# Patient Record
Sex: FEMALE | Race: OTHER | HISPANIC OR LATINO | URBAN - METROPOLITAN AREA
[De-identification: names, ages, dates, MRNs, and addresses within clinical notes are randomized per-mention and may not be internally consistent; named-entity substitution may affect disease eponyms.]

---

## 2017-09-03 ENCOUNTER — EMERGENCY (EMERGENCY)
Facility: HOSPITAL | Age: 37
LOS: 1 days | Discharge: HOME | End: 2017-09-03
Attending: EMERGENCY MEDICINE
Payer: COMMERCIAL

## 2017-09-03 VITALS
DIASTOLIC BLOOD PRESSURE: 80 MMHG | SYSTOLIC BLOOD PRESSURE: 120 MMHG | RESPIRATION RATE: 16 BRPM | TEMPERATURE: 99 F | OXYGEN SATURATION: 100 % | HEART RATE: 81 BPM

## 2017-09-03 LAB
BASOPHILS # BLD AUTO: 0.01 K/UL — SIGNIFICANT CHANGE UP (ref 0–0.2)
BASOPHILS NFR BLD AUTO: 0.1 % — SIGNIFICANT CHANGE UP (ref 0–2)
BUN SERPL-MCNC: 11 MG/DL — SIGNIFICANT CHANGE UP (ref 7–23)
CALCIUM SERPL-MCNC: 9.6 MG/DL — SIGNIFICANT CHANGE UP (ref 8.4–10.5)
CHLORIDE SERPL-SCNC: 101 MMOL/L — SIGNIFICANT CHANGE UP (ref 98–107)
CK SERPL-CCNC: 87 U/L — SIGNIFICANT CHANGE UP (ref 25–170)
CO2 SERPL-SCNC: 27 MMOL/L — SIGNIFICANT CHANGE UP (ref 22–31)
CREAT SERPL-MCNC: 0.72 MG/DL — SIGNIFICANT CHANGE UP (ref 0.5–1.3)
EOSINOPHIL # BLD AUTO: 0.13 K/UL — SIGNIFICANT CHANGE UP (ref 0–0.5)
EOSINOPHIL NFR BLD AUTO: 1.9 % — SIGNIFICANT CHANGE UP (ref 0–6)
GLUCOSE SERPL-MCNC: 92 MG/DL — SIGNIFICANT CHANGE UP (ref 70–99)
HCG SERPL-ACNC: < 5 MIU/ML — SIGNIFICANT CHANGE UP
HCT VFR BLD CALC: 40.1 % — SIGNIFICANT CHANGE UP (ref 34.5–45)
HGB BLD-MCNC: 13.3 G/DL — SIGNIFICANT CHANGE UP (ref 11.5–15.5)
IMM GRANULOCYTES # BLD AUTO: 0.01 # — SIGNIFICANT CHANGE UP
IMM GRANULOCYTES NFR BLD AUTO: 0.1 % — SIGNIFICANT CHANGE UP (ref 0–1.5)
LYMPHOCYTES # BLD AUTO: 2.41 K/UL — SIGNIFICANT CHANGE UP (ref 1–3.3)
LYMPHOCYTES # BLD AUTO: 35.2 % — SIGNIFICANT CHANGE UP (ref 13–44)
MCHC RBC-ENTMCNC: 27.7 PG — SIGNIFICANT CHANGE UP (ref 27–34)
MCHC RBC-ENTMCNC: 33.2 % — SIGNIFICANT CHANGE UP (ref 32–36)
MCV RBC AUTO: 83.4 FL — SIGNIFICANT CHANGE UP (ref 80–100)
MONOCYTES # BLD AUTO: 0.66 K/UL — SIGNIFICANT CHANGE UP (ref 0–0.9)
MONOCYTES NFR BLD AUTO: 9.6 % — SIGNIFICANT CHANGE UP (ref 2–14)
NEUTROPHILS # BLD AUTO: 3.62 K/UL — SIGNIFICANT CHANGE UP (ref 1.8–7.4)
NEUTROPHILS NFR BLD AUTO: 53.1 % — SIGNIFICANT CHANGE UP (ref 43–77)
NRBC # FLD: 0 — SIGNIFICANT CHANGE UP
PLATELET # BLD AUTO: 208 K/UL — SIGNIFICANT CHANGE UP (ref 150–400)
PMV BLD: 10.8 FL — SIGNIFICANT CHANGE UP (ref 7–13)
POTASSIUM SERPL-MCNC: 4.3 MMOL/L — SIGNIFICANT CHANGE UP (ref 3.5–5.3)
POTASSIUM SERPL-SCNC: 4.3 MMOL/L — SIGNIFICANT CHANGE UP (ref 3.5–5.3)
RBC # BLD: 4.81 M/UL — SIGNIFICANT CHANGE UP (ref 3.8–5.2)
RBC # FLD: 12.3 % — SIGNIFICANT CHANGE UP (ref 10.3–14.5)
SODIUM SERPL-SCNC: 140 MMOL/L — SIGNIFICANT CHANGE UP (ref 135–145)
TROPONIN T SERPL-MCNC: < 0.06 NG/ML — SIGNIFICANT CHANGE UP (ref 0–0.06)
WBC # BLD: 6.84 K/UL — SIGNIFICANT CHANGE UP (ref 3.8–10.5)
WBC # FLD AUTO: 6.84 K/UL — SIGNIFICANT CHANGE UP (ref 3.8–10.5)

## 2017-09-03 PROCEDURE — 99222 1ST HOSP IP/OBS MODERATE 55: CPT

## 2017-09-03 PROCEDURE — 99284 EMERGENCY DEPT VISIT MOD MDM: CPT | Mod: 25

## 2017-09-03 PROCEDURE — 70450 CT HEAD/BRAIN W/O DYE: CPT | Mod: 26

## 2017-09-03 PROCEDURE — 70548 MR ANGIOGRAPHY NECK W/DYE: CPT | Mod: 26

## 2017-09-03 PROCEDURE — 70551 MRI BRAIN STEM W/O DYE: CPT | Mod: 26

## 2017-09-03 PROCEDURE — 93010 ELECTROCARDIOGRAM REPORT: CPT | Mod: 59

## 2017-09-03 PROCEDURE — 70544 MR ANGIOGRAPHY HEAD W/O DYE: CPT | Mod: 26,59

## 2017-09-03 RX ORDER — ONDANSETRON 8 MG/1
4 TABLET, FILM COATED ORAL ONCE
Qty: 0 | Refills: 0 | Status: COMPLETED | OUTPATIENT
Start: 2017-09-03 | End: 2017-09-03

## 2017-09-03 RX ORDER — MECLIZINE HCL 12.5 MG
50 TABLET ORAL ONCE
Qty: 0 | Refills: 0 | Status: COMPLETED | OUTPATIENT
Start: 2017-09-03 | End: 2017-09-03

## 2017-09-03 RX ORDER — SODIUM CHLORIDE 9 MG/ML
1000 INJECTION INTRAMUSCULAR; INTRAVENOUS; SUBCUTANEOUS ONCE
Qty: 0 | Refills: 0 | Status: COMPLETED | OUTPATIENT
Start: 2017-09-03 | End: 2017-09-03

## 2017-09-03 RX ORDER — MECLIZINE HCL 12.5 MG
25 TABLET ORAL ONCE
Qty: 0 | Refills: 0 | Status: COMPLETED | OUTPATIENT
Start: 2017-09-03 | End: 2017-09-03

## 2017-09-03 RX ORDER — METOCLOPRAMIDE HCL 10 MG
10 TABLET ORAL ONCE
Qty: 0 | Refills: 0 | Status: DISCONTINUED | OUTPATIENT
Start: 2017-09-03 | End: 2017-09-03

## 2017-09-03 RX ADMIN — Medication 50 MILLIGRAM(S): at 15:57

## 2017-09-03 RX ADMIN — ONDANSETRON 4 MILLIGRAM(S): 8 TABLET, FILM COATED ORAL at 14:16

## 2017-09-03 RX ADMIN — SODIUM CHLORIDE 1000 MILLILITER(S): 9 INJECTION INTRAMUSCULAR; INTRAVENOUS; SUBCUTANEOUS at 14:17

## 2017-09-03 RX ADMIN — Medication 25 MILLIGRAM(S): at 14:16

## 2017-09-03 NOTE — ED CDU PROVIDER NOTE - ATTENDING CONTRIBUTION TO CARE
Attending Statement: I have reviewed and agree with all pertinent clinical information, including history and physical exam and agree with treatment plan of the PA, except as noted.  see HPI/PE CDU ATT ADMIT NOTE Dr. Viramontes: I performed a face to face bedside interview with patient regarding history of present illness, review of symptoms and past medical history. I completed an independent physical exam.  I have discussed patient's plan of care with PA.   I agree with note as stated above, having amended the EMR as needed to reflect my findings.   This includes HISTORY OF PRESENT ILLNESS, HIV, PAST MEDICAL/SURGICAL/FAMILY/SOCIAL HISTORY, ALLERGIES AND HOME MEDICATIONS, REVIEW OF SYSTEMS, PHYSICAL EXAM, and any PROGRESS NOTES during the time I functioned as the attending physician for this patient. 37F c/o 3d nausea and vertigo. Past three days patient notes room spinning sensation, nausea, neg emesis. Neg ha, cp, sob. Main ED meclizine, valium, zofran, seen by Neuro for no relief. Patient c/o persistent left sided ha and nausea. CDU for symptom control, MRI, MRA, neuro reassess. PE nad, aaox3, perrl, eomi, ctabl, rrr, s1s2, abd soft ntnd, neg le edema. Neg nystagmus. Nl finger nose. Nl rapid alt movement. Nl heel shin. Nl gait. Neg romberg.

## 2017-09-03 NOTE — ED CDU PROVIDER NOTE - PLAN OF CARE
take meclizine 25mg every 8 hours as needed for dizziness. Drink plenty of fluids. Follow up with Keiry Alexandra [phone] within 1-2 weeks. Return to ED for any worsening symptoms.

## 2017-09-03 NOTE — ED CDU PROVIDER NOTE - PROGRESS NOTE DETAILS
CONCHA Vega: pt resting comfortably in bed NAD pt states she feels better.  Pt returned from MRI, will continue to monitor. CONCHA Vega: pt sleeping comfortably in bed NAD.  Pt received MRI awaiting results, will continue to monitor. CONCHA Prieto: Received sign out from CONCAH Vega. Pt evaluated at bedside with Dr. Coko. Is asymptomatic at present. MRI read negative for acute pathology. Evaluated by neurology, cleared for discharge. CDU MD CHO:  Pt resting comfortably in nad, no complaints.  No nystagmus, motor 5/5 x4 ext, distal sensory grossly intact x4 ext, normal gait, normal heel to shin, normal finger to nose.  Pending MRI read. CDU MD CHO - Attending Discharge Note: Patient is stable.  Labs and tests reviewed with the patient.  The patient is stable for discharge.

## 2017-09-03 NOTE — ED CDU PROVIDER NOTE - PHYSICAL EXAMINATION
pt no distress. EOMI. no nystagmus. no facial asymmetry. AOx3, no focal deficits. CN 2-12 grossly intact. nl gait. no meningeal signs. no pronator drift. nl tm bl. nl sensation bl face/arms and lower ext.

## 2017-09-03 NOTE — ED PROVIDER NOTE - PROGRESS NOTE DETAILS
pt still symptomatic, feeling "room moving" Informed of results. Neurology consult appreciated, recommend CDU. pt willing to stay. has no pmd. Kevin Viramontes.

## 2017-09-03 NOTE — ED CDU PROVIDER NOTE - DETAILS
36 y/o F pt came in to ED with c/o dizziness, nausea and headache, was given meclizine, zofran, valium, with out much relief, neuro evaluated pt in ED sent her to CDU for pain / nausea control, MRA head neck, MRI head. Pt still c/o left sided pressure headache left sided facial pain with nausea.sent to cdu for MRI/MRA head and neck, monitor and reassess

## 2017-09-03 NOTE — ED PROVIDER NOTE - PHYSICAL EXAMINATION
pt no distress. EOMI. no nystagmus. no facial asymmetry. AOx3, no focal deficits. CN 2-12 grossly intact. nl gait. no meningeal signs. no pronator drift. nl tm bl. pt no distress. EOMI. no nystagmus. no facial asymmetry. AOx3, no focal deficits. CN 2-12 grossly intact. nl gait. no meningeal signs. no pronator drift. nl tm bl. nl sensation bl face/arms and lower ext.

## 2017-09-03 NOTE — CONSULT NOTE ADULT - SUBJECTIVE AND OBJECTIVE BOX
Neurology Consult Note    Reason for consult - Dizziness vertigo like symptoms for 3 days    Pt is a 36yo F no sig pmhx pw three days of feeling dizzy, room spinning. It has been constant associated w nausea. She reports having some numbness and discomfort in her left hand. Pt denies any other change sin lifestyle, eating, no recent ear infections. Pt reports the symptoms started suddenly and she feels it more on the left side of her face and head. She reports that when she walks she feels heavier on her left side.     ROS no vomiting no headache. no fever or chills. Discomfort of the left hand, "feels numb" no weakness. no cp or sob. no abdominal pain. no change in hearing or sight. no blurred vision. no double vision. not on AC. LMP couple days ago, Denies preg.  Home medications   No significant PMHx      Vital Signs Last 24 Hrs  T(C): 37.1 (03 Sep 2017 14:18), Max: 37.1 (03 Sep 2017 14:18)  T(F): 98.7 (03 Sep 2017 14:18), Max: 98.7 (03 Sep 2017 14:18)  HR: 74 (03 Sep 2017 14:18) (74 - 81)  BP: 122/70 (03 Sep 2017 14:18) (120/80 - 122/70)  BP(mean): --  RR: 20 (03 Sep 2017 14:18) (16 - 20)  SpO2: 100% (03 Sep 2017 14:18) (100% - 100%)    Physical Exam  General - calm pleasant female lying in ED stretcher  Face - no periorbital tenderness, no mastoid tenderness, no tragal tenderness   EAR - right ear blocked with wax tympanic membrane no visualized, left ear tympanic membrane clear  Neurological:  MS - AAOx3, rep ition and naming in tact, good fund of knowledge, clear and fluent speech  CNs - face symmetric, no nystagmus appreciated in any direction, there was a very mild beat on left gaze, hearing b/l symmetric  Motor - 5/5 throughout  Sensation - 95% decrease in sensation on the left hand if the right hand was 100%, temperature, propripception and light touch in tact throughout   Coordination - FTN in tact B/L   Gait - slightly hesitant upon standing, however pt was dizzy at the time     Fiona edmondson pike- negative b/l  Head thrust - no nystagmus     Labs                        13.3   6.84  )-----------( 208      ( 03 Sep 2017 14:29 )             40.1   09-03    140  |  101  |  11  ----------------------------<  92  4.3   |  27  |  0.72    Ca    9.6      03 Sep 2017 14:29    Imaging  Ct head (9/3) - No CT evidence of acute intracranial hemorrhage, brain edema, mass effect or acute intracranial pathology.

## 2017-09-03 NOTE — CONSULT NOTE ADULT - ASSESSMENT
Pt is a 36yo F no sig pmhx pw three days of vertiginous symptoms (room spinng, dizziness and nausea) for three days. Pt reports there was a slight improvement however was transient and she felt dizzy again and has been ongoing for three days. On physical exam pt does not have any peripheral signs (nystagmus, dixhallpike was negative, tympanic membranes clear b/bl Pt is a 36yo F no sig pmhx pw three days of vertiginous symptoms (room spinng, dizziness and nausea) for three days. Pt reports there was a slight improvement however was transient and she felt dizzy again and has been ongoing for three days. She has no hearing chnages or ringing in the ears, no vision changes, weakness anywhere but does report an abnormal sensation in her left hand and having issues picking up her kid. On physical exam pt does not have any peripheral signs (nystagmus, dixhallpike was negative, tympanic membranes clear B/l. No other deficits, no weakness or focal CN findings. CT head shows no acute findings. Given that pt does not have any clear peripheral cause, and symptoms are ongoing without intermittent resolution.     Dx - Vertigo 2/2 CVA vs peripheral causes Meniere's labrynthitis)     Recommendations:  MRI wo gadolinium to rule out acute CVA   Meclizine PRN for dizziness  IVF    Spectra [id number]

## 2017-09-03 NOTE — ED PROVIDER NOTE - OBJECTIVE STATEMENT
36yo F no sig pmhx pw three days of feeling dizzy, room is spinning. It has been constant associated w nausea. no vomit. no headache. no fever or chills. Discomfort of the left hand, "feels numb" no weakness. no cp or sob. no abdominal pain. no change in hearing or sight. no blurred vision. no double vision. not on AC. LMP couple days ago, denies preg.

## 2017-09-03 NOTE — ED ADULT TRIAGE NOTE - CHIEF COMPLAINT QUOTE
pt c/o dizziness x3 days. reports spinning sensation initially now c/o feeling unbalanced. was [facility] and was instructed to come to ED for head ct. [id number]

## 2017-09-03 NOTE — ED CDU PROVIDER NOTE - OBJECTIVE STATEMENT
ED Provider note: 38yo F no sig pmhx pw three days of feeling dizzy, room is spinning. It has been constant associated w nausea. no vomit. no headache. no fever or chills. Discomfort of the left hand, "feels numb" no weakness. no cp or sob. no abdominal pain. no change in hearing or sight. no blurred vision. no double vision. not on AC. LMP couple days ago, denies preg.    CDU CONCHA Patterson note: I agree with above ED Provider note: 36yo F no sig pmhx pw three days of feeling dizzy, room is spinning. It has been constant associated w nausea. no vomit. no headache. no fever or chills. Discomfort of the left hand, "feels numb" no weakness. no cp or sob. no abdominal pain. no change in hearing or sight. no blurred vision. no double vision. not on AC. LMP couple days ago, denies preg.    CDU PA Leonardo: I agree with above. 38 yo F no pmhx presents with 3 days of feeling dizzy, room is spinning. It has been constant associated w nausea. Pt was given meclizine, zofran, valium, with out much relief, neuro evaluated pt in ED sent her to CDU for pain / nausea control, MRA head neck, MRI head. Pt still c/o left sided pressure headache left sided facial pain with nausea. Pt denies vomiting, sob, cp, weakness, abd pain, dysuria.

## 2017-09-04 VITALS
HEART RATE: 89 BPM | OXYGEN SATURATION: 100 % | SYSTOLIC BLOOD PRESSURE: 107 MMHG | TEMPERATURE: 98 F | DIASTOLIC BLOOD PRESSURE: 69 MMHG | RESPIRATION RATE: 16 BRPM

## 2017-09-04 LAB
CHOLEST SERPL-MCNC: 159 MG/DL — SIGNIFICANT CHANGE UP (ref 120–199)
HDLC SERPL-MCNC: 45 MG/DL — SIGNIFICANT CHANGE UP (ref 45–65)
LIPID PNL WITH DIRECT LDL SERPL: 109 MG/DL — SIGNIFICANT CHANGE UP
TRIGL SERPL-MCNC: 96 MG/DL — SIGNIFICANT CHANGE UP (ref 10–149)

## 2017-09-04 RX ORDER — MECLIZINE HCL 12.5 MG
1 TABLET ORAL
Qty: 20 | Refills: 0 | OUTPATIENT
Start: 2017-09-04

## 2017-10-10 ENCOUNTER — APPOINTMENT (OUTPATIENT)
Dept: OBGYN | Facility: CLINIC | Age: 37
End: 2017-10-10
Payer: COMMERCIAL

## 2017-10-10 VITALS
SYSTOLIC BLOOD PRESSURE: 108 MMHG | WEIGHT: 151 LBS | HEART RATE: 86 BPM | BODY MASS INDEX: 26.75 KG/M2 | DIASTOLIC BLOOD PRESSURE: 74 MMHG | HEIGHT: 63 IN

## 2017-10-10 PROCEDURE — 99395 PREV VISIT EST AGE 18-39: CPT

## 2017-10-10 RX ORDER — MECLIZINE HYDROCHLORIDE 25 MG/1
25 TABLET ORAL
Qty: 20 | Refills: 0 | Status: DISCONTINUED | COMMUNITY
Start: 2017-09-05

## 2017-10-10 RX ORDER — MULTIVITAMIN
TABLET ORAL
Refills: 0 | Status: ACTIVE | COMMUNITY

## 2018-01-02 ENCOUNTER — APPOINTMENT (OUTPATIENT)
Dept: OBGYN | Facility: CLINIC | Age: 38
End: 2018-01-02
Payer: COMMERCIAL

## 2018-01-02 VITALS
BODY MASS INDEX: 26.93 KG/M2 | DIASTOLIC BLOOD PRESSURE: 75 MMHG | HEART RATE: 178 BPM | WEIGHT: 152 LBS | HEIGHT: 63 IN | SYSTOLIC BLOOD PRESSURE: 112 MMHG

## 2018-01-02 DIAGNOSIS — N60.12 DIFFUSE CYSTIC MASTOPATHY OF LEFT BREAST: ICD-10-CM

## 2018-01-02 PROCEDURE — 99213 OFFICE O/P EST LOW 20 MIN: CPT

## 2018-01-29 ENCOUNTER — APPOINTMENT (OUTPATIENT)
Dept: MAMMOGRAPHY | Facility: IMAGING CENTER | Age: 38
End: 2018-01-29

## 2018-01-29 ENCOUNTER — APPOINTMENT (OUTPATIENT)
Dept: ULTRASOUND IMAGING | Facility: IMAGING CENTER | Age: 38
End: 2018-01-29

## 2020-11-16 ENCOUNTER — APPOINTMENT (OUTPATIENT)
Dept: OBGYN | Facility: CLINIC | Age: 40
End: 2020-11-16
Payer: COMMERCIAL

## 2020-11-16 VITALS
TEMPERATURE: 98.6 F | DIASTOLIC BLOOD PRESSURE: 72 MMHG | HEART RATE: 70 BPM | SYSTOLIC BLOOD PRESSURE: 112 MMHG | HEIGHT: 63 IN | BODY MASS INDEX: 27.46 KG/M2 | WEIGHT: 155 LBS

## 2020-11-16 DIAGNOSIS — K92.1 MELENA: ICD-10-CM

## 2020-11-16 DIAGNOSIS — R10.32 LEFT LOWER QUADRANT PAIN: ICD-10-CM

## 2020-11-16 PROCEDURE — 99072 ADDL SUPL MATRL&STAF TM PHE: CPT

## 2020-11-16 PROCEDURE — 99396 PREV VISIT EST AGE 40-64: CPT

## 2020-11-16 NOTE — PHYSICAL EXAM
[Appropriately responsive] : appropriately responsive [Alert] : alert [No Acute Distress] : no acute distress [No Lymphadenopathy] : no lymphadenopathy [Regular Rate Rhythm] : regular rate rhythm [No Murmurs] : no murmurs [Clear to Auscultation B/L] : clear to auscultation bilaterally [Soft] : soft [Non-tender] : non-tender [Non-distended] : non-distended [No HSM] : No HSM [No Lesions] : no lesions [No Mass] : no mass [Oriented x3] : oriented x3 [Examination Of The Breasts] : a normal appearance [No Masses] : no breast masses were palpable [Labia Majora] : normal [Labia Minora] : normal [Normal] : normal [Anteversion] : anteverted [Uterine Adnexae] : normal [Nl Sphincter Tone] : normal sphincter tone

## 2020-11-16 NOTE — HISTORY OF PRESENT ILLNESS
[FreeTextEntry1] : 40 y.o. P 1001 [facility] 10/end/20 for annual exam. pt c/o left sided pain and blood in stool when she wiped . pt is due for pap and mammo,  .

## 2020-11-16 NOTE — DISCUSSION/SUMMARY
[FreeTextEntry1] : A - WWV\par      blood in stool\par      LLQ pain\par \par P-- f/u for results\par       pap , Gen probe Urine C&S, Affirmed done\par       referral for G.I. for blood in stool given\par       referral for mammo given \par      referral for pelvic sono for LLQ pain at same location as mammo. \par      nutritional counseling provided\par      exercise encouraged

## 2020-11-17 LAB
C TRACH RRNA SPEC QL NAA+PROBE: NOT DETECTED
CANDIDA VAG CYTO: NOT DETECTED
G VAGINALIS+PREV SP MTYP VAG QL MICRO: NOT DETECTED
N GONORRHOEA RRNA SPEC QL NAA+PROBE: NOT DETECTED
SOURCE AMPLIFICATION: NORMAL
T VAGINALIS VAG QL WET PREP: NOT DETECTED

## 2020-11-18 LAB
BACTERIA UR CULT: NORMAL
HPV HIGH+LOW RISK DNA PNL CVX: NOT DETECTED

## 2020-11-20 LAB — CYTOLOGY CVX/VAG DOC THIN PREP: ABNORMAL

## 2020-12-07 ENCOUNTER — NON-APPOINTMENT (OUTPATIENT)
Age: 40
End: 2020-12-07

## 2020-12-09 ENCOUNTER — NON-APPOINTMENT (OUTPATIENT)
Age: 40
End: 2020-12-09

## 2020-12-10 ENCOUNTER — NON-APPOINTMENT (OUTPATIENT)
Age: 40
End: 2020-12-10

## 2020-12-11 ENCOUNTER — NON-APPOINTMENT (OUTPATIENT)
Age: 40
End: 2020-12-11

## 2021-02-02 ENCOUNTER — EMERGENCY (EMERGENCY)
Facility: HOSPITAL | Age: 41
LOS: 1 days | Discharge: HOME | End: 2021-02-02
Attending: STUDENT IN AN ORGANIZED HEALTH CARE EDUCATION/TRAINING PROGRAM | Admitting: STUDENT IN AN ORGANIZED HEALTH CARE EDUCATION/TRAINING PROGRAM
Payer: COMMERCIAL

## 2021-02-02 VITALS
OXYGEN SATURATION: 100 % | HEART RATE: 94 BPM | RESPIRATION RATE: 16 BRPM | SYSTOLIC BLOOD PRESSURE: 111 MMHG | DIASTOLIC BLOOD PRESSURE: 82 MMHG | TEMPERATURE: 98 F

## 2021-02-02 VITALS
HEIGHT: 63 IN | HEART RATE: 83 BPM | DIASTOLIC BLOOD PRESSURE: 92 MMHG | RESPIRATION RATE: 16 BRPM | TEMPERATURE: 98 F | OXYGEN SATURATION: 100 % | SYSTOLIC BLOOD PRESSURE: 138 MMHG

## 2021-02-02 LAB
APPEARANCE UR: CLEAR — SIGNIFICANT CHANGE UP
BILIRUB UR-MCNC: NEGATIVE — SIGNIFICANT CHANGE UP
COLOR SPEC: COLORLESS — SIGNIFICANT CHANGE UP
DIFF PNL FLD: NEGATIVE — SIGNIFICANT CHANGE UP
GLUCOSE UR QL: NEGATIVE — SIGNIFICANT CHANGE UP
KETONES UR-MCNC: NEGATIVE — SIGNIFICANT CHANGE UP
LEUKOCYTE ESTERASE UR-ACNC: NEGATIVE — SIGNIFICANT CHANGE UP
NITRITE UR-MCNC: NEGATIVE — SIGNIFICANT CHANGE UP
PH UR: 6.5 — SIGNIFICANT CHANGE UP (ref 5–8)
PROT UR-MCNC: NEGATIVE — SIGNIFICANT CHANGE UP
SP GR SPEC: 1.01 — LOW (ref 1.01–1.02)
UROBILINOGEN FLD QL: SIGNIFICANT CHANGE UP

## 2021-02-02 PROCEDURE — 70450 CT HEAD/BRAIN W/O DYE: CPT | Mod: 26

## 2021-02-02 PROCEDURE — 72125 CT NECK SPINE W/O DYE: CPT | Mod: 26

## 2021-02-02 PROCEDURE — 99284 EMERGENCY DEPT VISIT MOD MDM: CPT

## 2021-02-02 RX ORDER — OXYCODONE AND ACETAMINOPHEN 5; 325 MG/1; MG/1
1 TABLET ORAL ONCE
Refills: 0 | Status: DISCONTINUED | OUTPATIENT
Start: 2021-02-02 | End: 2021-02-02

## 2021-02-02 RX ORDER — LIDOCAINE 4 G/100G
1 CREAM TOPICAL ONCE
Refills: 0 | Status: COMPLETED | OUTPATIENT
Start: 2021-02-02 | End: 2021-02-02

## 2021-02-02 RX ADMIN — LIDOCAINE 1 PATCH: 4 CREAM TOPICAL at 18:06

## 2021-02-02 RX ADMIN — OXYCODONE AND ACETAMINOPHEN 1 TABLET(S): 5; 325 TABLET ORAL at 18:06

## 2021-02-02 NOTE — ED PROVIDER NOTE - NSFOLLOWUPINSTRUCTIONS_ED_ALL_ED_FT
1. TAKE ALL MEDICATIONS AS DIRECTED.    2. FOR PAIN OR FEVER YOU CAN TAKE IBUPROFEN (MOTRIN, ADVIL) OR ACETAMINOPHEN (TYLENOL) AS NEEDED, AS DIRECTED ON PACKAGING.  3. FOLLOW UP WITH YOUR PRIMARY DOCTOR WITHIN 5 DAYS AS DIRECTED.  4. IF YOU HAD LABS OR IMAGING DONE, YOU WERE GIVEN COPIES OF ALL LABS AND/OR IMAGING RESULTS FROM YOUR ER VISIT--PLEASE TAKE THEM WITH YOU TO YOUR FOLLOW UP APPOINTMENTS.  5. IF NEEDED, CALL PATIENT ACCESS SERVICES AT [phone] TO FIND A PRIMARY CARE PHYSICIAN.  OR CALL [phone] TO MAKE AN APPOINTMENT WITH THE CLINIC.  6. RETURN TO THE ER FOR ANY WORSENING SYMPTOMS OR CONCERNS.    Closed Head Injury    A closed head injury is an injury to your head that may or may not involve a traumatic brain injury (TBI). Symptoms of TBI can be short or long lasting and include headache, dizziness, interference with memory or speech, fatigue, confusion, changes in sleep, mood changes, nausea, depression/anxiety, and dulling of senses. Make sure to obtain proper rest which includes getting plenty of sleep, avoiding excessive visual stimulation, and avoiding activities that may cause physical or mental stress. Avoid any situation where there is potential for another head injury, including sports.    SEEK IMMEDIATE MEDICAL CARE IF YOU HAVE ANY OF THE FOLLOWING SYMPTOMS: unusual drowsiness, vomiting, severe dizziness, seizures, lightheadedness, muscular weakness, different pupil sizes, visual changes, or clear or bloody discharge from your ears or nose.

## 2021-02-02 NOTE — ED ADULT NURSE NOTE - NSFALLRSKINDICATORS_ED_ALL_ED
-- Message is from the [facility]--    Reason for Call: Patient's mother will like a call back from the doctor to discuss patient's recurring symptoms as seen on Monday.Mother states that along with the stomach pains patient did have a fever of 102.1 yesterday and will like a call to discuss.Please call to advise.    Caller Information       Type Contact Phone    04/03/2019 07:49 AM Phone (Incoming) ART MILLER (Mother) [phone] (M)          Alternative phone number: n/a    Turnaround time given to caller:   \"This message will be sent to [state Provider's name]. The clinical team will fulfill your request as soon as they review your message.\"    
I reviewed the task.  I called mom.  I ordered a chest xray, because of the productive cough with a fever.  I sent a prescription for Zithromax Suspension to their pharmacy.  
Refer to MD who saw patient, to discuss plan of care and follow up.  Xrays were taken at visit.   
no

## 2021-02-02 NOTE — ED ADULT NURSE NOTE - OBJECTIVE STATEMENT
Pt presents to ED TR A s/p mechanical fall at home, slipped and fell down 3 steps, hit her head,   c/o lump R temporal area and head pain, b/l lower back pain R>L. Denies LOC. Denies AC or antiplatelet use. Ambulating is painful but able to ambulate independently in ED. Denies any N/V/ vision changes/ weakness/ CP/ SOB/ neck pain/ other acute complaints. Is in NAD, breathing even and unlabored, VSS. UCG neg, meds administered as per EMAR. Stretcher in lowest locked position, call bell within reach. Will cont to monitor.

## 2021-02-02 NOTE — ED PROVIDER NOTE - ATTENDING CONTRIBUTION TO CARE
41 yo F with no pmh presents s/p mechanical fall. Reports slipping on her socks while going down steps to her basement. pt fell backwards, hitting left side of her neck and right side of her back, pt states she saw black for a second but then was able to see, no nauea, no vomiting, no headache, just haspain in left neck and right side of head and right lower abck. no midline neck or back pain  denies a/c use  pt well appearing on exam  neuro intact, no midline neck or back tenderness  + r flank tenderness but no ecchymosis  [place] head ct does not indicateneed for CT head, howvere pt requesting and anxious  will give pain contorl, ct head, lidocaine patch for pain  given tenderness over flank will check UA

## 2021-02-02 NOTE — ED PROVIDER NOTE - PHYSICAL EXAMINATION
Gen: AAOx3, non-toxic  Head: NCAT  HEENT: Right occipital/ perietal hematoma. No c-spine midline TTP. R sided paraspinal C-spine tenderness.   Lung: CTAB, no respiratory distress, no wheezes/rhonchi/rales B/L, speaking in full sentences  CV: RRR, no murmurs, rubs or gallops  Abd: soft, NTND, no guarding, no CVA tenderness  MSK: Moving all extremities. Right sided lower back TTP, no midline TTP. See above.   Neuro: No focal sensory or motor deficits, normal CN exam   Skin: Warm, well perfused, no rash  Psych: normal affect.

## 2021-02-02 NOTE — ED PROVIDER NOTE - CLINICAL SUMMARY MEDICAL DECISION MAKING FREE TEXT BOX
39 yo F with no pmh presents s/p mechanical fall. VS WNL. No midline TTP, ambulatory, no neuro deficits. Likely concussion vs unlikely intracranial bleeding.  Head and neck CT, analgesia, UA and re-assess

## 2021-02-02 NOTE — ED PROVIDER NOTE - PATIENT PORTAL LINK FT
You can access the FollowMyHealth Patient Portal offered by [facility] by registering at the following website: [email or web address]. By joining [facility]’s FollowMyHealth portal, you will also be able to view your health information using other applications (apps) compatible with our system.

## 2021-02-02 NOTE — ED PROVIDER NOTE - OBJECTIVE STATEMENT
39 yo F with no pmh presents s/p mechanical fall. Reports slipping while descending stairs 3 staps. Landed on her back and reports posterior head pain, and leateral lower back pain with no neuro deficits, blurred vision, nausea or vomiting. Did not take pain meds. Not on blood thinners. Able to ambulate.

## 2021-02-02 NOTE — ED ADULT TRIAGE NOTE - CHIEF COMPLAINT QUOTE
Pt presents to ED ambulatory fro home with c/o R hip pain s/p fall down 3 stairs. Pt states she hit the L side of her head. Pt denies LOC states she "saw stars". Pt relates headache, denies vision changes, numbness, tingling, N/V, or abdominal pain.

## 2021-02-02 NOTE — ED ADULT NURSE NOTE - NSIMPLEMENTINTERV_GEN_ALL_ED
Implemented All Fall Risk Interventions:  [place] to call system. Call bell, personal items and telephone within reach. Instruct patient to call for assistance. Room bathroom lighting operational. Non-slip footwear when patient is off stretcher. Physically safe environment: no spills, clutter or unnecessary equipment. Stretcher in lowest position, wheels locked, appropriate side rails in place. Provide visual cue, wrist band, yellow gown, etc. Monitor gait and stability. Monitor for mental status changes and reorient to person, place, and time. Review medications for side effects contributing to fall risk. Reinforce activity limits and safety measures with patient and family.

## 2021-07-07 ENCOUNTER — APPOINTMENT (OUTPATIENT)
Dept: ULTRASOUND IMAGING | Facility: CLINIC | Age: 41
End: 2021-07-07

## 2021-07-07 ENCOUNTER — RESULT REVIEW (OUTPATIENT)
Age: 41
End: 2021-07-07

## 2021-07-07 ENCOUNTER — APPOINTMENT (OUTPATIENT)
Dept: MAMMOGRAPHY | Facility: CLINIC | Age: 41
End: 2021-07-07
Payer: COMMERCIAL

## 2021-07-07 PROCEDURE — 77067 SCR MAMMO BI INCL CAD: CPT

## 2021-07-07 PROCEDURE — 77063 BREAST TOMOSYNTHESIS BI: CPT

## 2021-11-22 ENCOUNTER — APPOINTMENT (OUTPATIENT)
Dept: OBGYN | Facility: CLINIC | Age: 41
End: 2021-11-22

## 2022-04-19 ENCOUNTER — APPOINTMENT (OUTPATIENT)
Dept: OBGYN | Facility: CLINIC | Age: 42
End: 2022-04-19
Payer: COMMERCIAL

## 2022-04-19 VITALS
HEIGHT: 63 IN | HEART RATE: 93 BPM | DIASTOLIC BLOOD PRESSURE: 80 MMHG | BODY MASS INDEX: 27.11 KG/M2 | SYSTOLIC BLOOD PRESSURE: 114 MMHG | WEIGHT: 153 LBS

## 2022-04-19 DIAGNOSIS — Z01.419 ENCOUNTER FOR GYNECOLOGICAL EXAMINATION (GENERAL) (ROUTINE) W/OUT ABNORMAL FINDINGS: ICD-10-CM

## 2022-04-19 DIAGNOSIS — M54.50 LOW BACK PAIN, UNSPECIFIED: ICD-10-CM

## 2022-04-19 PROCEDURE — 99396 PREV VISIT EST AGE 40-64: CPT

## 2022-04-19 NOTE — DISCUSSION/SUMMARY
[FreeTextEntry1] : A - WWV\par     lower back pain s/p fall\par \par P- f/u 1 year\par     will refer to Neurologist \par     exercise encouraged\par    nutritional counseling provided, \par     pap done, \par     referral for mammo and breast sono given,

## 2022-04-19 NOTE — HISTORY OF PRESENT ILLNESS
[FreeTextEntry1] : 42 y.o. P 1001 [facility] 4/10/22. presents for annual exam. . pt has lower back pain s/p fall  going downstairs in basement . pt has had medical follow up, and is  on antiinflammatory meds and analgesics.

## 2022-04-20 ENCOUNTER — TRANSCRIPTION ENCOUNTER (OUTPATIENT)
Age: 42
End: 2022-04-20

## 2022-04-20 LAB
BACTERIA UR CULT: NORMAL
HPV HIGH+LOW RISK DNA PNL CVX: NOT DETECTED

## 2022-04-24 LAB — CYTOLOGY CVX/VAG DOC THIN PREP: NORMAL

## 2022-04-25 ENCOUNTER — NON-APPOINTMENT (OUTPATIENT)
Age: 42
End: 2022-04-25

## 2022-10-21 ENCOUNTER — NON-APPOINTMENT (OUTPATIENT)
Age: 42
End: 2022-10-21

## 2022-10-21 DIAGNOSIS — R92.2 INCONCLUSIVE MAMMOGRAM: ICD-10-CM

## 2022-11-07 ENCOUNTER — RESULT REVIEW (OUTPATIENT)
Age: 42
End: 2022-11-07

## 2022-11-07 ENCOUNTER — APPOINTMENT (OUTPATIENT)
Dept: ULTRASOUND IMAGING | Facility: CLINIC | Age: 42
End: 2022-11-07

## 2022-11-07 ENCOUNTER — APPOINTMENT (OUTPATIENT)
Dept: MAMMOGRAPHY | Facility: CLINIC | Age: 42
End: 2022-11-07

## 2022-11-07 PROCEDURE — 77067 SCR MAMMO BI INCL CAD: CPT

## 2022-11-07 PROCEDURE — 77063 BREAST TOMOSYNTHESIS BI: CPT

## 2022-11-07 PROCEDURE — 76641 ULTRASOUND BREAST COMPLETE: CPT | Mod: 50

## 2024-02-19 NOTE — CONSULT NOTE ADULT - ATTENDING COMMENTS
Addended by: COLETTE DIAZ on: 2/19/2024 05:05 PM     Modules accepted: Orders     Feeling better and ambulating, MRI brain negative.  DC home

## 2024-10-11 ENCOUNTER — APPOINTMENT (OUTPATIENT)
Dept: OTOLARYNGOLOGY | Facility: CLINIC | Age: 44
End: 2024-10-11
Payer: COMMERCIAL

## 2024-10-11 VITALS
DIASTOLIC BLOOD PRESSURE: 82 MMHG | HEART RATE: 81 BPM | OXYGEN SATURATION: 99 % | SYSTOLIC BLOOD PRESSURE: 114 MMHG | WEIGHT: 135 LBS | BODY MASS INDEX: 23.92 KG/M2 | HEIGHT: 63 IN

## 2024-10-11 DIAGNOSIS — J34.89 OTHER SPECIFIED DISORDERS OF NOSE AND NASAL SINUSES: ICD-10-CM

## 2024-10-11 DIAGNOSIS — R20.2 PARESTHESIA OF SKIN: ICD-10-CM

## 2024-10-11 PROCEDURE — 99203 OFFICE O/P NEW LOW 30 MIN: CPT | Mod: 25

## 2024-10-11 PROCEDURE — 31231 NASAL ENDOSCOPY DX: CPT

## 2024-10-11 RX ORDER — FLUTICASONE PROPIONATE 50 UG/1
50 SPRAY, METERED NASAL TWICE DAILY
Qty: 1 | Refills: 3 | Status: ACTIVE | COMMUNITY
Start: 2024-10-11 | End: 1900-01-01

## 2024-10-11 RX ORDER — SOD CHLOR,BICARB/SQUEEZ BOTTLE
PACKET, WITH RINSE DEVICE NASAL
Qty: 100 | Refills: 2 | Status: ACTIVE | COMMUNITY
Start: 2024-10-11 | End: 1900-01-01

## 2024-10-23 ENCOUNTER — APPOINTMENT (OUTPATIENT)
Dept: OBGYN | Facility: CLINIC | Age: 44
End: 2024-10-23
Payer: COMMERCIAL

## 2024-10-23 VITALS
WEIGHT: 134 LBS | BODY MASS INDEX: 23.74 KG/M2 | DIASTOLIC BLOOD PRESSURE: 75 MMHG | HEIGHT: 63 IN | HEART RATE: 80 BPM | SYSTOLIC BLOOD PRESSURE: 105 MMHG

## 2024-10-23 DIAGNOSIS — R10.31 RIGHT LOWER QUADRANT PAIN: ICD-10-CM

## 2024-10-23 DIAGNOSIS — Z01.419 ENCOUNTER FOR GYNECOLOGICAL EXAMINATION (GENERAL) (ROUTINE) W/OUT ABNORMAL FINDINGS: ICD-10-CM

## 2024-10-23 PROCEDURE — 99459 PELVIC EXAMINATION: CPT

## 2024-10-23 PROCEDURE — 99396 PREV VISIT EST AGE 40-64: CPT

## 2024-10-23 PROCEDURE — 99214 OFFICE O/P EST MOD 30 MIN: CPT | Mod: 25

## 2024-10-25 LAB — HPV HIGH+LOW RISK DNA PNL CVX: NOT DETECTED

## 2024-10-28 LAB — CYTOLOGY CVX/VAG DOC THIN PREP: NORMAL

## 2024-10-30 ENCOUNTER — APPOINTMENT (OUTPATIENT)
Dept: OTOLARYNGOLOGY | Facility: CLINIC | Age: 44
End: 2024-10-30

## 2024-11-12 NOTE — ED ADULT NURSE NOTE - TEMPLATE LIST FOR HEAD TO TOE ASSESSMENT
This patient has been assessed with a concern for Malnutrition and was treated during this hospitalization for the following Nutrition diagnosis/diagnoses:     -  11/11/2024: Moderate protein-calorie malnutrition  
General

## 2024-12-11 ENCOUNTER — APPOINTMENT (OUTPATIENT)
Dept: OTOLARYNGOLOGY | Facility: CLINIC | Age: 44
End: 2024-12-11

## 2025-01-02 ENCOUNTER — OUTPATIENT (OUTPATIENT)
Dept: OUTPATIENT SERVICES | Facility: HOSPITAL | Age: 45
LOS: 1 days | End: 2025-01-02
Payer: COMMERCIAL

## 2025-01-02 ENCOUNTER — APPOINTMENT (OUTPATIENT)
Dept: ULTRASOUND IMAGING | Facility: CLINIC | Age: 45
End: 2025-01-02
Payer: COMMERCIAL

## 2025-01-02 DIAGNOSIS — R10.32 LEFT LOWER QUADRANT PAIN: ICD-10-CM

## 2025-01-02 PROCEDURE — 76856 US EXAM PELVIC COMPLETE: CPT | Mod: 26

## 2025-01-02 PROCEDURE — 76856 US EXAM PELVIC COMPLETE: CPT

## 2025-01-20 ENCOUNTER — OUTPATIENT (OUTPATIENT)
Dept: OUTPATIENT SERVICES | Facility: HOSPITAL | Age: 45
LOS: 1 days | End: 2025-01-20
Payer: COMMERCIAL

## 2025-01-20 ENCOUNTER — APPOINTMENT (OUTPATIENT)
Dept: ULTRASOUND IMAGING | Facility: CLINIC | Age: 45
End: 2025-01-20
Payer: COMMERCIAL

## 2025-01-20 ENCOUNTER — APPOINTMENT (OUTPATIENT)
Dept: MAMMOGRAPHY | Facility: CLINIC | Age: 45
End: 2025-01-20
Payer: COMMERCIAL

## 2025-01-20 ENCOUNTER — RESULT REVIEW (OUTPATIENT)
Age: 45
End: 2025-01-20

## 2025-01-20 DIAGNOSIS — R10.32 LEFT LOWER QUADRANT PAIN: ICD-10-CM

## 2025-01-20 PROCEDURE — 77067 SCR MAMMO BI INCL CAD: CPT

## 2025-01-20 PROCEDURE — 77063 BREAST TOMOSYNTHESIS BI: CPT | Mod: 26

## 2025-01-20 PROCEDURE — 77067 SCR MAMMO BI INCL CAD: CPT | Mod: 26

## 2025-01-20 PROCEDURE — 76641 ULTRASOUND BREAST COMPLETE: CPT | Mod: 26,50

## 2025-01-20 PROCEDURE — 76641 ULTRASOUND BREAST COMPLETE: CPT

## 2025-01-20 PROCEDURE — 77063 BREAST TOMOSYNTHESIS BI: CPT
